# Patient Record
Sex: FEMALE | Race: BLACK OR AFRICAN AMERICAN | Employment: OTHER | URBAN - METROPOLITAN AREA
[De-identification: names, ages, dates, MRNs, and addresses within clinical notes are randomized per-mention and may not be internally consistent; named-entity substitution may affect disease eponyms.]

---

## 2024-07-02 ENCOUNTER — HOSPITAL ENCOUNTER (OUTPATIENT)
Facility: HOSPITAL | Age: 39
Discharge: HOME | End: 2024-07-05
Payer: MEDICARE

## 2024-07-02 ENCOUNTER — TRANSCRIBE ORDERS (OUTPATIENT)
Facility: HOSPITAL | Age: 39
End: 2024-07-02

## 2024-07-02 DIAGNOSIS — K64.2 THIRD DEGREE HEMORRHOIDS: ICD-10-CM

## 2024-07-02 DIAGNOSIS — D01.3 CARCINOMA IN SITU OF ANAL CANAL: ICD-10-CM

## 2024-07-02 DIAGNOSIS — D01.3 CARCINOMA IN SITU OF ANAL CANAL: Primary | ICD-10-CM

## 2024-07-02 DIAGNOSIS — Z01.818 PRE-OP TESTING: ICD-10-CM

## 2024-07-02 LAB
ANION GAP SERPL CALC-SCNC: 5 MMOL/L (ref 3–18)
BUN SERPL-MCNC: 13 MG/DL (ref 7–18)
BUN/CREAT SERPL: 25 (ref 12–20)
CALCIUM SERPL-MCNC: 8.7 MG/DL (ref 8.5–10.1)
CHLORIDE SERPL-SCNC: 111 MMOL/L (ref 100–111)
CO2 SERPL-SCNC: 26 MMOL/L (ref 21–32)
CREAT SERPL-MCNC: 0.53 MG/DL (ref 0.6–1.3)
EKG ATRIAL RATE: 61 BPM
EKG DIAGNOSIS: NORMAL
EKG P AXIS: 4 DEGREES
EKG P-R INTERVAL: 120 MS
EKG Q-T INTERVAL: 424 MS
EKG QRS DURATION: 90 MS
EKG QTC CALCULATION (BAZETT): 426 MS
EKG R AXIS: 81 DEGREES
EKG T AXIS: 78 DEGREES
EKG VENTRICULAR RATE: 61 BPM
ERYTHROCYTE [DISTWIDTH] IN BLOOD BY AUTOMATED COUNT: 13.8 % (ref 11.6–14.5)
FAX TO NUMBER: NORMAL
GLUCOSE SERPL-MCNC: 99 MG/DL (ref 74–99)
HCG SERPL QL: NEGATIVE
HCT VFR BLD AUTO: 38.3 % (ref 35–45)
HGB BLD-MCNC: 12.3 G/DL (ref 12–16)
MCH RBC QN AUTO: 29.9 PG (ref 24–34)
MCHC RBC AUTO-ENTMCNC: 32.1 G/DL (ref 31–37)
MCV RBC AUTO: 93 FL (ref 78–100)
NRBC # BLD: 0 K/UL (ref 0–0.01)
NRBC BLD-RTO: 0 PER 100 WBC
PLATELET # BLD AUTO: 342 K/UL (ref 135–420)
PMV BLD AUTO: 9.1 FL (ref 9.2–11.8)
POTASSIUM SERPL-SCNC: 3.6 MMOL/L (ref 3.5–5.5)
RBC # BLD AUTO: 4.12 M/UL (ref 4.2–5.3)
SODIUM SERPL-SCNC: 142 MMOL/L (ref 136–145)
TEST RESULTS FAXED TO: NORMAL
WBC # BLD AUTO: 9.7 K/UL (ref 4.6–13.2)

## 2024-07-02 PROCEDURE — 85027 COMPLETE CBC AUTOMATED: CPT

## 2024-07-02 PROCEDURE — 93005 ELECTROCARDIOGRAM TRACING: CPT | Performed by: SURGERY

## 2024-07-02 PROCEDURE — 84703 CHORIONIC GONADOTROPIN ASSAY: CPT

## 2024-07-02 PROCEDURE — 80048 BASIC METABOLIC PNL TOTAL CA: CPT

## 2024-07-02 PROCEDURE — 36415 COLL VENOUS BLD VENIPUNCTURE: CPT

## 2024-07-03 ENCOUNTER — ANESTHESIA EVENT (OUTPATIENT)
Facility: HOSPITAL | Age: 39
End: 2024-07-03
Payer: MEDICARE

## 2024-07-07 RX ORDER — SODIUM CHLORIDE 0.9 % (FLUSH) 0.9 %
5-40 SYRINGE (ML) INJECTION EVERY 12 HOURS SCHEDULED
Status: CANCELLED | OUTPATIENT
Start: 2024-07-07

## 2024-07-07 RX ORDER — NALOXONE HYDROCHLORIDE 0.4 MG/ML
INJECTION, SOLUTION INTRAMUSCULAR; INTRAVENOUS; SUBCUTANEOUS PRN
Status: CANCELLED | OUTPATIENT
Start: 2024-07-07

## 2024-07-07 RX ORDER — LABETALOL HYDROCHLORIDE 5 MG/ML
10 INJECTION, SOLUTION INTRAVENOUS
Status: CANCELLED | OUTPATIENT
Start: 2024-07-07

## 2024-07-07 RX ORDER — SODIUM CHLORIDE 9 MG/ML
INJECTION, SOLUTION INTRAVENOUS PRN
Status: CANCELLED | OUTPATIENT
Start: 2024-07-07

## 2024-07-07 RX ORDER — OXYCODONE HYDROCHLORIDE 5 MG/1
5 TABLET ORAL
Status: CANCELLED | OUTPATIENT
Start: 2024-07-07 | End: 2024-07-08

## 2024-07-07 RX ORDER — HYDROMORPHONE HYDROCHLORIDE 1 MG/ML
0.25 INJECTION, SOLUTION INTRAMUSCULAR; INTRAVENOUS; SUBCUTANEOUS EVERY 5 MIN PRN
Status: CANCELLED | OUTPATIENT
Start: 2024-07-07

## 2024-07-07 RX ORDER — SODIUM CHLORIDE 0.9 % (FLUSH) 0.9 %
5-40 SYRINGE (ML) INJECTION PRN
Status: CANCELLED | OUTPATIENT
Start: 2024-07-07

## 2024-07-07 RX ORDER — DROPERIDOL 2.5 MG/ML
0.62 INJECTION, SOLUTION INTRAMUSCULAR; INTRAVENOUS
Status: CANCELLED | OUTPATIENT
Start: 2024-07-07 | End: 2024-07-08

## 2024-07-07 RX ORDER — FENTANYL CITRATE 50 UG/ML
25 INJECTION, SOLUTION INTRAMUSCULAR; INTRAVENOUS EVERY 5 MIN PRN
Status: CANCELLED | OUTPATIENT
Start: 2024-07-07

## 2024-07-07 RX ORDER — SODIUM CHLORIDE, SODIUM LACTATE, POTASSIUM CHLORIDE, CALCIUM CHLORIDE 600; 310; 30; 20 MG/100ML; MG/100ML; MG/100ML; MG/100ML
INJECTION, SOLUTION INTRAVENOUS CONTINUOUS
Status: CANCELLED | OUTPATIENT
Start: 2024-07-07

## 2024-07-08 ENCOUNTER — HOSPITAL ENCOUNTER (EMERGENCY)
Facility: HOSPITAL | Age: 39
Discharge: HOME | End: 2024-07-08
Payer: MEDICARE

## 2024-07-08 ENCOUNTER — ANESTHESIA (OUTPATIENT)
Facility: HOSPITAL | Age: 39
End: 2024-07-08
Payer: MEDICARE

## 2024-07-08 ENCOUNTER — HOSPITAL ENCOUNTER (OUTPATIENT)
Facility: HOSPITAL | Age: 39
Setting detail: OUTPATIENT SURGERY
Discharge: HOME | End: 2024-07-08
Attending: SURGERY | Admitting: SURGERY
Payer: MEDICARE

## 2024-07-08 VITALS
BODY MASS INDEX: 25.95 KG/M2 | RESPIRATION RATE: 18 BRPM | SYSTOLIC BLOOD PRESSURE: 112 MMHG | OXYGEN SATURATION: 99 % | DIASTOLIC BLOOD PRESSURE: 75 MMHG | TEMPERATURE: 97.3 F | HEIGHT: 62 IN | HEART RATE: 61 BPM | WEIGHT: 141 LBS

## 2024-07-08 VITALS
SYSTOLIC BLOOD PRESSURE: 111 MMHG | TEMPERATURE: 98 F | DIASTOLIC BLOOD PRESSURE: 69 MMHG | OXYGEN SATURATION: 99 % | HEART RATE: 60 BPM | RESPIRATION RATE: 18 BRPM

## 2024-07-08 DIAGNOSIS — G89.18 POSTOPERATIVE PAIN: Primary | ICD-10-CM

## 2024-07-08 DIAGNOSIS — K60.2 ANAL FISSURE: Primary | ICD-10-CM

## 2024-07-08 DIAGNOSIS — Z98.890 STATUS POST HEMORRHOIDECTOMY: ICD-10-CM

## 2024-07-08 DIAGNOSIS — R33.9 URINARY RETENTION: ICD-10-CM

## 2024-07-08 DIAGNOSIS — Z87.19 STATUS POST HEMORRHOIDECTOMY: ICD-10-CM

## 2024-07-08 LAB
ALBUMIN SERPL-MCNC: 3.4 G/DL (ref 3.4–5)
ALBUMIN/GLOB SERPL: 1.2 (ref 0.8–1.7)
ALP SERPL-CCNC: 61 U/L (ref 45–117)
ALT SERPL-CCNC: 12 U/L (ref 13–56)
ANION GAP SERPL CALC-SCNC: 5 MMOL/L (ref 3–18)
APPEARANCE UR: CLEAR
AST SERPL-CCNC: 11 U/L (ref 10–38)
BASOPHILS # BLD: 0 K/UL (ref 0–0.1)
BASOPHILS NFR BLD: 0 % (ref 0–2)
BILIRUB SERPL-MCNC: 0.2 MG/DL (ref 0.2–1)
BILIRUB UR QL: NEGATIVE
BUN SERPL-MCNC: 10 MG/DL (ref 7–18)
BUN/CREAT SERPL: 19 (ref 12–20)
CALCIUM SERPL-MCNC: 8.3 MG/DL (ref 8.5–10.1)
CHLORIDE SERPL-SCNC: 110 MMOL/L (ref 100–111)
CO2 SERPL-SCNC: 25 MMOL/L (ref 21–32)
COLOR UR: YELLOW
CREAT SERPL-MCNC: 0.54 MG/DL (ref 0.6–1.3)
DIFFERENTIAL METHOD BLD: ABNORMAL
EOSINOPHIL # BLD: 0.1 K/UL (ref 0–0.4)
EOSINOPHIL NFR BLD: 1 % (ref 0–5)
ERYTHROCYTE [DISTWIDTH] IN BLOOD BY AUTOMATED COUNT: 14.4 % (ref 11.6–14.5)
GLOBULIN SER CALC-MCNC: 2.8 G/DL (ref 2–4)
GLUCOSE SERPL-MCNC: 96 MG/DL (ref 74–99)
GLUCOSE UR STRIP.AUTO-MCNC: NEGATIVE MG/DL
HCG UR QL: NEGATIVE
HCT VFR BLD AUTO: 36.6 % (ref 35–45)
HGB BLD-MCNC: 12 G/DL (ref 12–16)
HGB UR QL STRIP: NEGATIVE
IMM GRANULOCYTES # BLD AUTO: 0 K/UL (ref 0–0.04)
IMM GRANULOCYTES NFR BLD AUTO: 0 % (ref 0–0.5)
KETONES UR QL STRIP.AUTO: ABNORMAL MG/DL
LEUKOCYTE ESTERASE UR QL STRIP.AUTO: NEGATIVE
LYMPHOCYTES # BLD: 2.2 K/UL (ref 0.9–3.6)
LYMPHOCYTES NFR BLD: 19 % (ref 21–52)
MCH RBC QN AUTO: 30.6 PG (ref 24–34)
MCHC RBC AUTO-ENTMCNC: 32.8 G/DL (ref 31–37)
MCV RBC AUTO: 93.4 FL (ref 78–100)
MONOCYTES # BLD: 1 K/UL (ref 0.05–1.2)
MONOCYTES NFR BLD: 8 % (ref 3–10)
NEUTS SEG # BLD: 8.3 K/UL (ref 1.8–8)
NEUTS SEG NFR BLD: 71 % (ref 40–73)
NITRITE UR QL STRIP.AUTO: NEGATIVE
NRBC # BLD: 0 K/UL (ref 0–0.01)
NRBC BLD-RTO: 0 PER 100 WBC
PH UR STRIP: 7 (ref 5–8)
PLATELET # BLD AUTO: 300 K/UL (ref 135–420)
PMV BLD AUTO: 9.2 FL (ref 9.2–11.8)
POTASSIUM SERPL-SCNC: 3.9 MMOL/L (ref 3.5–5.5)
PROT SERPL-MCNC: 6.2 G/DL (ref 6.4–8.2)
PROT UR STRIP-MCNC: NEGATIVE MG/DL
RBC # BLD AUTO: 3.92 M/UL (ref 4.2–5.3)
SODIUM SERPL-SCNC: 140 MMOL/L (ref 136–145)
SP GR UR REFRACTOMETRY: 1.01 (ref 1–1.03)
UROBILINOGEN UR QL STRIP.AUTO: 0.2 EU/DL (ref 0.2–1)
WBC # BLD AUTO: 11.7 K/UL (ref 4.6–13.2)

## 2024-07-08 PROCEDURE — 96374 THER/PROPH/DIAG INJ IV PUSH: CPT

## 2024-07-08 PROCEDURE — 6360000002 HC RX W HCPCS: Performed by: SURGERY

## 2024-07-08 PROCEDURE — 51701 INSERT BLADDER CATHETER: CPT

## 2024-07-08 PROCEDURE — 3700000001 HC ADD 15 MINUTES (ANESTHESIA): Performed by: SURGERY

## 2024-07-08 PROCEDURE — 6370000000 HC RX 637 (ALT 250 FOR IP): Performed by: SURGERY

## 2024-07-08 PROCEDURE — 7100000011 HC PHASE II RECOVERY - ADDTL 15 MIN: Performed by: SURGERY

## 2024-07-08 PROCEDURE — 2709999900 HC NON-CHARGEABLE SUPPLY: Performed by: SURGERY

## 2024-07-08 PROCEDURE — 3600000002 HC SURGERY LEVEL 2 BASE: Performed by: SURGERY

## 2024-07-08 PROCEDURE — 99284 EMERGENCY DEPT VISIT MOD MDM: CPT

## 2024-07-08 PROCEDURE — 6360000002 HC RX W HCPCS: Performed by: NURSE ANESTHETIST, CERTIFIED REGISTERED

## 2024-07-08 PROCEDURE — 85025 COMPLETE CBC W/AUTO DIFF WBC: CPT

## 2024-07-08 PROCEDURE — 81025 URINE PREGNANCY TEST: CPT

## 2024-07-08 PROCEDURE — 81003 URINALYSIS AUTO W/O SCOPE: CPT

## 2024-07-08 PROCEDURE — 2580000003 HC RX 258: Performed by: SURGERY

## 2024-07-08 PROCEDURE — 6360000002 HC RX W HCPCS: Performed by: SPECIALIST

## 2024-07-08 PROCEDURE — 7100000010 HC PHASE II RECOVERY - FIRST 15 MIN: Performed by: SURGERY

## 2024-07-08 PROCEDURE — 80053 COMPREHEN METABOLIC PANEL: CPT

## 2024-07-08 PROCEDURE — 96361 HYDRATE IV INFUSION ADD-ON: CPT

## 2024-07-08 PROCEDURE — 2500000003 HC RX 250 WO HCPCS: Performed by: NURSE ANESTHETIST, CERTIFIED REGISTERED

## 2024-07-08 PROCEDURE — 6360000002 HC RX W HCPCS: Performed by: NURSE PRACTITIONER

## 2024-07-08 PROCEDURE — 88304 TISSUE EXAM BY PATHOLOGIST: CPT

## 2024-07-08 PROCEDURE — 96375 TX/PRO/DX INJ NEW DRUG ADDON: CPT

## 2024-07-08 PROCEDURE — 3700000000 HC ANESTHESIA ATTENDED CARE: Performed by: SURGERY

## 2024-07-08 PROCEDURE — 2580000003 HC RX 258: Performed by: NURSE PRACTITIONER

## 2024-07-08 PROCEDURE — 3600000012 HC SURGERY LEVEL 2 ADDTL 15MIN: Performed by: SURGERY

## 2024-07-08 RX ORDER — BUPIVACAINE HYDROCHLORIDE 2.5 MG/ML
INJECTION, SOLUTION EPIDURAL; INFILTRATION; INTRACAUDAL PRN
Status: DISCONTINUED | OUTPATIENT
Start: 2024-07-08 | End: 2024-07-08 | Stop reason: ALTCHOICE

## 2024-07-08 RX ORDER — MORPHINE SULFATE 4 MG/ML
4 INJECTION, SOLUTION INTRAMUSCULAR; INTRAVENOUS
Status: COMPLETED | OUTPATIENT
Start: 2024-07-08 | End: 2024-07-08

## 2024-07-08 RX ORDER — LIDOCAINE HYDROCHLORIDE 20 MG/ML
INJECTION, SOLUTION EPIDURAL; INFILTRATION; INTRACAUDAL; PERINEURAL PRN
Status: DISCONTINUED | OUTPATIENT
Start: 2024-07-08 | End: 2024-07-08 | Stop reason: SDUPTHER

## 2024-07-08 RX ORDER — MIDAZOLAM HYDROCHLORIDE 1 MG/ML
INJECTION INTRAMUSCULAR; INTRAVENOUS PRN
Status: DISCONTINUED | OUTPATIENT
Start: 2024-07-08 | End: 2024-07-08 | Stop reason: SDUPTHER

## 2024-07-08 RX ORDER — 0.9 % SODIUM CHLORIDE 0.9 %
1000 INTRAVENOUS SOLUTION INTRAVENOUS ONCE
Status: COMPLETED | OUTPATIENT
Start: 2024-07-08 | End: 2024-07-08

## 2024-07-08 RX ORDER — LIDOCAINE HYDROCHLORIDE 20 MG/ML
JELLY TOPICAL PRN
Status: DISCONTINUED | OUTPATIENT
Start: 2024-07-08 | End: 2024-07-08 | Stop reason: ALTCHOICE

## 2024-07-08 RX ORDER — DIPHENHYDRAMINE HYDROCHLORIDE 50 MG/ML
12.5 INJECTION INTRAMUSCULAR; INTRAVENOUS
Status: COMPLETED | OUTPATIENT
Start: 2024-07-08 | End: 2024-07-08

## 2024-07-08 RX ORDER — GLYCOPYRROLATE 0.2 MG/ML
INJECTION INTRAMUSCULAR; INTRAVENOUS PRN
Status: DISCONTINUED | OUTPATIENT
Start: 2024-07-08 | End: 2024-07-08 | Stop reason: SDUPTHER

## 2024-07-08 RX ORDER — HYDROMORPHONE HYDROCHLORIDE 1 MG/ML
1 INJECTION, SOLUTION INTRAMUSCULAR; INTRAVENOUS; SUBCUTANEOUS
Status: COMPLETED | OUTPATIENT
Start: 2024-07-08 | End: 2024-07-08

## 2024-07-08 RX ORDER — PROPOFOL 10 MG/ML
INJECTION, EMULSION INTRAVENOUS PRN
Status: DISCONTINUED | OUTPATIENT
Start: 2024-07-08 | End: 2024-07-08 | Stop reason: SDUPTHER

## 2024-07-08 RX ORDER — SODIUM CHLORIDE, SODIUM LACTATE, POTASSIUM CHLORIDE, CALCIUM CHLORIDE 600; 310; 30; 20 MG/100ML; MG/100ML; MG/100ML; MG/100ML
INJECTION, SOLUTION INTRAVENOUS CONTINUOUS
Status: DISCONTINUED | OUTPATIENT
Start: 2024-07-08 | End: 2024-07-08 | Stop reason: HOSPADM

## 2024-07-08 RX ORDER — DIAZEPAM 5 MG/1
5 TABLET ORAL EVERY 8 HOURS PRN
Qty: 30 TABLET | Refills: 0 | Status: SHIPPED | OUTPATIENT
Start: 2024-07-08 | End: 2024-07-18

## 2024-07-08 RX ORDER — DIPHENHYDRAMINE HYDROCHLORIDE 50 MG/ML
25 INJECTION INTRAMUSCULAR; INTRAVENOUS
Status: COMPLETED | OUTPATIENT
Start: 2024-07-08 | End: 2024-07-08

## 2024-07-08 RX ORDER — ONDANSETRON 2 MG/ML
4 INJECTION INTRAMUSCULAR; INTRAVENOUS ONCE
Status: COMPLETED | OUTPATIENT
Start: 2024-07-08 | End: 2024-07-08

## 2024-07-08 RX ORDER — CHLORHEXIDINE GLUCONATE 40 MG/ML
SOLUTION TOPICAL DAILY PRN
Status: DISCONTINUED | OUTPATIENT
Start: 2024-07-08 | End: 2024-07-08 | Stop reason: HOSPADM

## 2024-07-08 RX ORDER — ONDANSETRON 2 MG/ML
4 INJECTION INTRAMUSCULAR; INTRAVENOUS
Status: COMPLETED | OUTPATIENT
Start: 2024-07-08 | End: 2024-07-08

## 2024-07-08 RX ADMIN — HYDROMORPHONE HYDROCHLORIDE 1 MG: 1 INJECTION, SOLUTION INTRAMUSCULAR; INTRAVENOUS; SUBCUTANEOUS at 17:16

## 2024-07-08 RX ADMIN — SODIUM CHLORIDE, SODIUM LACTATE, POTASSIUM CHLORIDE, AND CALCIUM CHLORIDE: 600; 310; 30; 20 INJECTION, SOLUTION INTRAVENOUS at 06:26

## 2024-07-08 RX ADMIN — MIDAZOLAM 4 MG: 1 INJECTION INTRAMUSCULAR; INTRAVENOUS at 08:11

## 2024-07-08 RX ADMIN — MORPHINE SULFATE 4 MG: 4 INJECTION, SOLUTION INTRAMUSCULAR; INTRAVENOUS at 16:25

## 2024-07-08 RX ADMIN — PROPOFOL 30 MG: 10 INJECTION, EMULSION INTRAVENOUS at 08:15

## 2024-07-08 RX ADMIN — LIDOCAINE HYDROCHLORIDE 100 MG: 20 INJECTION, SOLUTION EPIDURAL; INFILTRATION; INTRACAUDAL; PERINEURAL at 08:15

## 2024-07-08 RX ADMIN — MIDAZOLAM 2 MG: 1 INJECTION INTRAMUSCULAR; INTRAVENOUS at 08:17

## 2024-07-08 RX ADMIN — CEFOXITIN SODIUM 2000 MG: 2 POWDER, FOR SOLUTION INTRAVENOUS at 08:20

## 2024-07-08 RX ADMIN — DIPHENHYDRAMINE HYDROCHLORIDE 12.5 MG: 50 INJECTION, SOLUTION INTRAMUSCULAR; INTRAVENOUS at 10:01

## 2024-07-08 RX ADMIN — ONDANSETRON 4 MG: 2 INJECTION INTRAMUSCULAR; INTRAVENOUS at 16:25

## 2024-07-08 RX ADMIN — GLYCOPYRROLATE 0.1 MG: 0.2 INJECTION INTRAMUSCULAR; INTRAVENOUS at 08:11

## 2024-07-08 RX ADMIN — PROPOFOL 125 MCG/KG/MIN: 10 INJECTION, EMULSION INTRAVENOUS at 08:20

## 2024-07-08 RX ADMIN — HYDROMORPHONE HYDROCHLORIDE 1 MG: 1 INJECTION, SOLUTION INTRAMUSCULAR; INTRAVENOUS; SUBCUTANEOUS at 08:11

## 2024-07-08 RX ADMIN — SODIUM CHLORIDE, SODIUM LACTATE, POTASSIUM CHLORIDE, AND CALCIUM CHLORIDE: 600; 310; 30; 20 INJECTION, SOLUTION INTRAVENOUS at 09:26

## 2024-07-08 RX ADMIN — SODIUM CHLORIDE 1000 ML: 9 INJECTION, SOLUTION INTRAVENOUS at 16:24

## 2024-07-08 RX ADMIN — DIPHENHYDRAMINE HYDROCHLORIDE 25 MG: 50 INJECTION INTRAMUSCULAR; INTRAVENOUS at 18:04

## 2024-07-08 RX ADMIN — PROPOFOL 50 MG: 10 INJECTION, EMULSION INTRAVENOUS at 08:49

## 2024-07-08 RX ADMIN — ONDANSETRON 4 MG: 2 INJECTION INTRAMUSCULAR; INTRAVENOUS at 09:51

## 2024-07-08 ASSESSMENT — PAIN SCALES - GENERAL
PAINLEVEL_OUTOF10: 4
PAINLEVEL_OUTOF10: 0
PAINLEVEL_OUTOF10: 8
PAINLEVEL_OUTOF10: 8
PAINLEVEL_OUTOF10: 0
PAINLEVEL_OUTOF10: 0
PAINLEVEL_OUTOF10: 4

## 2024-07-08 ASSESSMENT — PAIN DESCRIPTION - DESCRIPTORS
DESCRIPTORS: CRAMPING;BURNING
DESCRIPTORS: SORE
DESCRIPTORS: PRESSURE

## 2024-07-08 ASSESSMENT — PAIN DESCRIPTION - ORIENTATION
ORIENTATION: LOWER
ORIENTATION: LOWER

## 2024-07-08 ASSESSMENT — PAIN DESCRIPTION - PAIN TYPE
TYPE: SURGICAL PAIN
TYPE: SURGICAL PAIN

## 2024-07-08 ASSESSMENT — PAIN - FUNCTIONAL ASSESSMENT
PAIN_FUNCTIONAL_ASSESSMENT: ACTIVITIES ARE NOT PREVENTED
PAIN_FUNCTIONAL_ASSESSMENT: 0-10
PAIN_FUNCTIONAL_ASSESSMENT: ACTIVITIES ARE NOT PREVENTED
PAIN_FUNCTIONAL_ASSESSMENT: ACTIVITIES ARE NOT PREVENTED

## 2024-07-08 ASSESSMENT — PAIN DESCRIPTION - LOCATION
LOCATION: BUTTOCKS
LOCATION: RECTUM

## 2024-07-08 ASSESSMENT — PAIN DESCRIPTION - FREQUENCY
FREQUENCY: INTERMITTENT
FREQUENCY: INTERMITTENT

## 2024-07-08 ASSESSMENT — PAIN DESCRIPTION - ONSET
ONSET: AWAKENED FROM SLEEP
ONSET: AWAKENED FROM SLEEP

## 2024-07-08 NOTE — ED TRIAGE NOTES
Pt arrives to ed reporting lower abd pain , pt had surgery this morning and was told to come back if she was unable to urinate.

## 2024-07-08 NOTE — ED PROVIDER NOTES
post hemorrhoidectomy            DISPOSITION/PLAN   DISPOSITION Decision To Discharge 07/08/2024 06:37:19 PM           PATIENT REFERRED TO:  Clarissa Richey DO  [address]  [address]  [facility] [address] [phone]    Schedule an appointment as soon as possible for a visit   For ER follow-up    [facility] EMERGENCY DEPT  [address]  [facility] [address] [phone]    As needed, If symptoms worsen         DISCHARGE MEDICATIONS:     Medication List        ASK your doctor about these medications      albuterol sulfate  (90 Base) MCG/ACT inhaler  Commonly known as: PROVENTIL;VENTOLIN;PROAIR     aspirin 81 MG EC tablet     diazePAM 5 MG tablet  Commonly known as: Valium  Take 1 tablet by mouth every 8 hours as needed for Sleep (rectal spasm) for up to 10 days. Max Daily Amount: 15 mg     diphenhydrAMINE 25 MG tablet  Commonly known as: BENADRYL     fluticasone furoate-vilanterol 100-25 MCG/ACT inhaler  Commonly known as: BREO ELLIPTA     lidocaine 5 % ointment  Commonly known as: XYLOCAINE  Apply topically as needed to the perianal area every 3-4 hrs     promethazine 25 MG tablet  Commonly known as: PHENERGAN                     I am the Primary Clinician of Record.       (Please note that parts of this dictation were completed with voice recognition software. Quite often unanticipated grammatical, syntax, homophones, and other interpretive errors are inadvertently transcribed by the computer software. Please disregards these errors. Please excuse any errors that have escaped final proofreading.)      Tsering Lewis, APRN - NP  07/08/24 [id number]

## 2024-07-08 NOTE — PERIOP NOTE
TRANSFER - IN REPORT:    Verbal report received from Ellen CARRANZA on Georgia Cuevas  being received from OR for routine post-op      Report consisted of patient's Situation, Background, Assessment and   Recommendations(SBAR).     Information from the following report(s) Nurse Handoff Report, Adult Overview, Surgery Report, Intake/Output, and MAR was reviewed with the receiving nurse.    Opportunity for questions and clarification was provided.      Assessment completed upon patient's arrival to unit and care assumed.

## 2024-07-08 NOTE — BRIEF OP NOTE
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]    Post-Op Diagnosis: Post-Op Diagnosis Codes:     * Third degree hemorrhoids [K64.2]     * Anal fissure [K60.2]           Procedure(s):  LATERAL INTERNAL SPHINCTEROTOMY, EXCISIONAL HEMORRHOIDECTOMY \"SPEC POP\"    Surgeon(s):  Clarissa Richey DO    Assistant:  * No surgical staff found *    Anesthesia: Monitor Anesthesia Care    Estimated Blood Loss (mL): Minimal    Complications: None    Specimens:   ID Type Source Tests Collected by Time Destination   A : HEMORRHOIDS Tissue Anus SURGICAL PATHOLOGY Clarissa Richey DO 7/8/2024 0856        Implants:  * No implants in log *      Drains: * No LDAs found *    Findings:  Infection Present At Time Of Surgery (PATOS) (choose all levels that have infection present):  No infection present  Other Findings: post midline fissure. Tight distal band IAS, complex int/ext hem RAL, LPL. No condyloma or concerning lesions internally or externally.    Electronically signed by Clarissa Richey DO on 7/8/2024 at 10:04 AM

## 2024-07-08 NOTE — ANESTHESIA PRE PROCEDURE
Department of Anesthesiology  Preprocedure Note       Name:  Georgia Cuevas   Age:  39 y.o.  :  1985                                          MRN:  [id number]         Date:  2024      Surgeon: Surgeon(s):  Clarissa Richey DO    Procedure: Procedure(s):  FULGURATION OF ANAL CONDYLOMA EXTENSIVE, LATERAL INTERNAL SPHINCTEROTOMY, EXCISIONAL HEMORRHOIDECTOMY \"SPEC POP\"    Medications prior to admission:   Prior to Admission medications    Medication Sig Start Date End Date Taking? Authorizing Provider   aspirin 81 MG EC tablet Take 1 tablet by mouth daily    Yani Brock MD   lidocaine (XYLOCAINE) 5 % ointment Apply topically as needed to the perianal area every 3-4 hrs  Patient not taking: Reported on 2024   Dary Tracy MD   albuterol sulfate HFA (PROVENTIL;VENTOLIN;PROAIR) 108 (90 Base) MCG/ACT inhaler Inhale 2 puffs into the lungs every 4 hours as needed for Wheezing    Yani Brock MD   fluticasone furoate-vilanterol (BREO ELLIPTA) 100-25 MCG/ACT inhaler Inhale 1 puff into the lungs daily    Yani Brock MD   clonazePAM (KLONOPIN) 0.5 MG tablet Take 0.5 tablets by mouth as needed.    Yani Brock MD   promethazine (PHENERGAN) 25 MG tablet Take 1 tablet by mouth every 8 hours as needed for Nausea  Patient not taking: Reported on 2024    Yani Brock MD   diphenhydrAMINE (BENADRYL) 25 MG tablet Take 1 tablet by mouth every 6 hours as needed for Itching  Patient not taking: Reported on 2024    Yani Brock MD       Current medications:    Current Facility-Administered Medications   Medication Dose Route Frequency Provider Last Rate Last Admin   • lactated ringers IV soln infusion   IntraVENous Continuous Clarissa Richey DO       • cefOXitin (MEFOXIN) 2,000 mg in sterile water 20 mL IV syringe  2,000 mg IntraVENous On Call to OR Clarissa Richey DO       • chlorhexidine gluconate (ANTISEPTIC SKIN CLEANSER) 4 % solution   Topical

## 2024-07-08 NOTE — ANESTHESIA POSTPROCEDURE EVALUATION
Post-Anesthesia Evaluation and Assessment    Cardiovascular Function/Vital Signs/Pain Score:  Vitals  BP: 106/69  Temp: 97.3 °F (36.3 °C)  Temp Source: Temporal  Pulse: 69  Respirations: 16  SpO2: 100 %  Height: 157.5 cm (5' 2\")  Weight - Scale: 64 kg (141 lb)  Pain Level: 2    Patient is status post Procedure(s):  LATERAL INTERNAL SPHINCTEROTOMY, EXCISIONAL HEMORRHOIDECTOMY \"SPEC POP\".    Nausea/Vomiting: Controlled.    Postoperative hydration reviewed and adequate.    Pain:  Managed    Mental Status and Level of Consciousness: Baseline and appropriate for discharge.    Adequate oxygenation and airway patent.    Complications related to anesthesia: None    Post-anesthesia assessment completed. No concerns.    Patient has met all discharge requirements.    Signed By: Gregorio Marks MD    July 8, 2024

## 2024-07-08 NOTE — OP NOTE
OPERATIVE NOTE    Patient: Georgia Cuevas MRN: [id number]  SSN: [id number]    YOB: 1985  Age: 39 y.o.  Sex: female      Indications: This is a 39 y.o. year-old female who presents with symptomatic hemorrhoids. The patient was admitted for surgery as conservative measures have failed.    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  Pt was counseled on the risks of the procedure including suboptimal/no effect, recurrence, bleeding, infection, wound healing problems, injury to local neurovascular structures, urinary retention requiring intervention, thrombosed external hemorrhoids, skin irritation, gas or fecal incontinence, anal stenosis, severe chronic pain, and allergic or adverse reaction to medications.   After obtaining informed consent and properly identifying the patient and area of focus, the patient expressed understanding and signed consent.  The patient was then taken to the operating room and was placed in prone jackknife position.  All pressure points were padded. A time out was performed. The patient’s perineum was prepped and draped in the standard surgical fashion.  MAC sedation and an anal block using 2% lidocaine and .5% Marcaine 50:50 solution was administered.  A lubricated Hill-Kruger retractor was placed in the anus

## 2024-07-08 NOTE — H&P
hemorrhoids and an anal fissure, which are causing significant discomfort and hygiene issues. The hemorrhoids are described as grade 3, prolapsing, and contributing to staining and difficulty in cleaning. The anal fissure is contributing to pain and discomfort, particularly during bowel movements.  - Plan: The treatment plan includes surgical removal of the problematic hemorrhoids and simultaneous treatment of the anal fissure. The fissure treatment will involve cutting the spastic band to provide longer-lasting relief. Preoperative preparation will include a fleet enema. Postoperative care will focus on pain management, including lidocaine, Valium, ice, bath soaks, and wound care to manage the expected recovery period of approximately four weeks. The procedures will be covered by her insurance (Medicare and Medicaid).    3. Brain Aneurysm:  - Assessment: Ms. 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  - Plan: Continue current management with regular monitoring and avoid any procedures that could increase intracranial pressure or risk due to her aneurysm status. No changes to the current neurosurgical management plan were discussed.    4. Photosensitivity:  - Assessment: Ms. Cuevas reports experiencing photosensitivity, particularly affecting her eyes.  - Plan: No specific treatment plan for photosensitivity was discussed during this consultation. Further evaluation by a specialist may be required if symptoms persist or worsen.         2. Assessment Personal history of antineoplastic chemotherapy (Z92.21).    Impression Pt informed that increased leak, infection, hernia and delayed wound healing risks are found with diabetes, tobacco, obesity, and steroids and other immunosuppressants and malnutrition.         3. Assessment Personal history of

## 2024-07-08 NOTE — PERIOP NOTE
Dual skin assessment completed with REMBERTO Ritter RN    HCG verified negtive with REMBERTO RitterRN

## 2024-07-08 NOTE — DISCHARGE SUMMARY
Discharge Summary    Patient: Georgia Cuevas MRN: [id number]  [facility]: [id number]    YOB: 1985  Age: 39 y.o.  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      Discharge Condition: Good    Discharge Disposition: Home    Procedure: Procedure(s):  LATERAL INTERNAL SPHINCTEROTOMY, EXCISIONAL HEMORRHOIDECTOMY \"SPEC POP\"    Surgeon(s): Surgeon(s) and Role:     * Clarissa Richey DO - Primary    Hospital Course:  Uncomplicated.  Upon discharge the patient was ambulating, urinating, having bowel movements, and tolerating a diet with pain well controlled.  It is expected that the patient continues to improve upon discharge.    Consults: None    Significant Diagnostic Studies: none    Discharge Medications:     Current Discharge Medication List        START taking these medications    Details   diazePAM (VALIUM) 5 MG tablet Take 1 tablet by mouth every 8 hours as needed for Sleep (rectal spasm) for up to 10 days. Max Daily Amount: 15 mg  Qty: 30 tablet, Refills: 0    Associated Diagnoses: Anal fissure           CONTINUE these medications which have NOT CHANGED    Details   aspirin 81 MG EC tablet Take 1 tablet by mouth daily      lidocaine (XYLOCAINE) 5 % ointment Apply topically as needed to the perianal area every 3-4 hrs  Qty: 30 g, Refills: 1      albuterol sulfate HFA (PROVENTIL;VENTOLIN;PROAIR) 108 (90 Base) MCG/ACT inhaler Inhale 2 puffs into the lungs every 4 hours as needed for Wheezing      fluticasone furoate-vilanterol (BREO ELLIPTA) 100-25 MCG/ACT inhaler Inhale 1 puff into the lungs daily      promethazine (PHENERGAN) 25 MG tablet Take 1 tablet by mouth every 8 hours as needed for

## 2024-07-08 NOTE — PROGRESS NOTES
Discussed plan goals and risks again with pt and partner present.     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  All RBA rediscussed. Pt and partner understood and agreed plan as above.  All questions answered.

## 2024-07-08 NOTE — DISCHARGE INSTRUCTIONS
Post-Operative Discharge Instructions  Clarissa Richey DO, STEPHANY, FACS  [facility]  [address], [address], [place] [address] [phone]    Patient: Georgia Cuevas MRN: [id number]  [facility]: [id number]    YOB: 1985  Age: 39 y.o.  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  It can be removed later today with first BM or in bathtub.  This can be a PAINFUL operation. Use the narcotics if prescribed for the first day or so. They are very constipating. Take senokot-S or miralax 1-2x a day with a large, extra glass of water to soften the stool. ICE to area for swelling.  place GAUZE between buttocks at anus 3x/day to absorb moisture.  Maxi pads do NOT work - only protect clothes not skin.   Begin Sitz baths by soaking in a warm to hot bathtub the night of surgery and 3-4 times per day thereafter.   You may eat anything you want.   Expect a small amount of bleeding (several tablespoons). If the bleeding is excessive, call the office.   You may drive and resume your normal activities 24-48 hours after surgery if you are not taking prescription pain-killers or muscle relaxants.    If your bowels do not move in 2-3 days, use magnesium citrate.  Take one bottle and repeat another bottle if no effect in 4-6hrs.   Call the office if you have any problems or questions. Your post op appointment will be 4 weeks after surgery if needed. (no post op appt for botox or RBL unless problems)    Medications  1) Valium for rectal spasm  2) Calmoseptine paste to protect skin from moisture or irritation. Over the counter.  3) Balneol (anal cleansing lotion) to clean anus

## 2024-07-08 NOTE — PERIOP NOTE
Discharge instructions reviewed with patient and family.  Opportunity for questions and answers given.  No further questions at this time.   Tolerating po fluids -

## 2024-07-08 NOTE — PROGRESS NOTES
PACU FAMILY UPDATE    Called friend and discussed the findings and the conduct of the case as well as post-op course expectations.  All questions were answered.

## 2024-12-12 NOTE — DISCHARGE INSTRUCTIONS
Medications as previously prescribed  Increase fluid intake  Sitz bath's as discussed  Follow-up with Dr. Richey, call for appointment  Return to care for new or worsening symptoms to include inability to pass urine or other concerning symptoms   - - -

## (undated) DEVICE — SUTURE VICRYL + SZ 2-0 L27IN ABSRB UD CT-2 L26MM 1/2 CIR TAPR VCP269H

## (undated) DEVICE — SYRINGE MED 10ML TRNSLUC BRL PLUNG BLK MRK POLYPR CTRL

## (undated) DEVICE — SURGIFOAM SPNG SZ 100

## (undated) DEVICE — GLOVE SURG SZ 7 L12IN FNGR THK79MIL GRN LTX FREE

## (undated) DEVICE — PREMIUM WET SKIN PREP TRAY: Brand: MEDLINE INDUSTRIES, INC.

## (undated) DEVICE — MASTISOL ADHESIVE LIQ 2/3ML

## (undated) DEVICE — PAD,ABDOMINAL,5"X9",ST,LF,25/BX: Brand: MEDLINE INDUSTRIES, INC.

## (undated) DEVICE — SOLUTION IRRIG 500ML 0.9% SOD CHLO USP POUR PLAS BTL

## (undated) DEVICE — GARMENT,MEDLINE,DVT,INT,CALF,MED, GEN2: Brand: MEDLINE

## (undated) DEVICE — TUBING WITH WAND

## (undated) DEVICE — MINOR: Brand: MEDLINE INDUSTRIES, INC.

## (undated) DEVICE — PAD,SANITARY,11 IN,MAXI,N-STRL,IND WRAP: Brand: MEDLINE

## (undated) DEVICE — AGENT HEMOSTATIC SURG ORIGINAL ABS 4X8IN LOOSE KNIT 12/CA

## (undated) DEVICE — GLOVE SURG SZ 65 THK91MIL LTX FREE SYN POLYISOPRENE

## (undated) DEVICE — SPONGE GZ W4XL4IN COT 12 PLY TYP VII WVN C FLD DSGN STERILE